# Patient Record
Sex: MALE | Race: OTHER | HISPANIC OR LATINO | URBAN - METROPOLITAN AREA
[De-identification: names, ages, dates, MRNs, and addresses within clinical notes are randomized per-mention and may not be internally consistent; named-entity substitution may affect disease eponyms.]

---

## 2020-09-16 ENCOUNTER — INPATIENT (INPATIENT)
Facility: HOSPITAL | Age: 70
LOS: 0 days | Discharge: LEFT BEFORE DISCHARGE | DRG: 640 | End: 2020-09-17
Attending: STUDENT IN AN ORGANIZED HEALTH CARE EDUCATION/TRAINING PROGRAM | Admitting: STUDENT IN AN ORGANIZED HEALTH CARE EDUCATION/TRAINING PROGRAM
Payer: COMMERCIAL

## 2020-09-16 VITALS
OXYGEN SATURATION: 98 % | DIASTOLIC BLOOD PRESSURE: 94 MMHG | HEART RATE: 67 BPM | RESPIRATION RATE: 18 BRPM | WEIGHT: 175.05 LBS | HEIGHT: 68 IN | TEMPERATURE: 98 F | SYSTOLIC BLOOD PRESSURE: 186 MMHG

## 2020-09-16 LAB
ANION GAP SERPL CALC-SCNC: 9 MMOL/L — SIGNIFICANT CHANGE UP (ref 5–17)
APPEARANCE UR: CLEAR — SIGNIFICANT CHANGE UP
BILIRUB UR-MCNC: NEGATIVE — SIGNIFICANT CHANGE UP
BUN SERPL-MCNC: 14 MG/DL — SIGNIFICANT CHANGE UP (ref 7–23)
CALCIUM SERPL-MCNC: 8.8 MG/DL — SIGNIFICANT CHANGE UP (ref 8.4–10.5)
CHLORIDE SERPL-SCNC: 93 MMOL/L — LOW (ref 96–108)
CO2 SERPL-SCNC: 22 MMOL/L — SIGNIFICANT CHANGE UP (ref 22–31)
COLOR SPEC: YELLOW — SIGNIFICANT CHANGE UP
CREAT SERPL-MCNC: 0.82 MG/DL — SIGNIFICANT CHANGE UP (ref 0.5–1.3)
DIFF PNL FLD: NEGATIVE — SIGNIFICANT CHANGE UP
GLUCOSE BLDC GLUCOMTR-MCNC: 89 MG/DL — SIGNIFICANT CHANGE UP (ref 70–99)
GLUCOSE SERPL-MCNC: 113 MG/DL — HIGH (ref 70–99)
GLUCOSE UR QL: NEGATIVE — SIGNIFICANT CHANGE UP
KETONES UR-MCNC: NEGATIVE — SIGNIFICANT CHANGE UP
LEUKOCYTE ESTERASE UR-ACNC: NEGATIVE — SIGNIFICANT CHANGE UP
NITRITE UR-MCNC: NEGATIVE — SIGNIFICANT CHANGE UP
OSMOLALITY UR: 469 MOSM/KG — SIGNIFICANT CHANGE UP (ref 300–900)
PH UR: 6.5 — SIGNIFICANT CHANGE UP (ref 5–8)
POTASSIUM SERPL-MCNC: 4.9 MMOL/L — SIGNIFICANT CHANGE UP (ref 3.5–5.3)
POTASSIUM SERPL-SCNC: 4.9 MMOL/L — SIGNIFICANT CHANGE UP (ref 3.5–5.3)
PROT UR-MCNC: NEGATIVE MG/DL — SIGNIFICANT CHANGE UP
SODIUM SERPL-SCNC: 124 MMOL/L — LOW (ref 135–145)
SODIUM UR-SCNC: 98 MMOL/L — SIGNIFICANT CHANGE UP
SP GR SPEC: 1.01 — SIGNIFICANT CHANGE UP (ref 1–1.03)
UROBILINOGEN FLD QL: 0.2 E.U./DL — SIGNIFICANT CHANGE UP

## 2020-09-16 PROCEDURE — 70450 CT HEAD/BRAIN W/O DYE: CPT | Mod: 26

## 2020-09-16 PROCEDURE — 93010 ELECTROCARDIOGRAM REPORT: CPT | Mod: NC

## 2020-09-16 PROCEDURE — 99285 EMERGENCY DEPT VISIT HI MDM: CPT

## 2020-09-16 PROCEDURE — 99223 1ST HOSP IP/OBS HIGH 75: CPT | Mod: GC

## 2020-09-16 RX ORDER — ACETAMINOPHEN 500 MG
650 TABLET ORAL ONCE
Refills: 0 | Status: COMPLETED | OUTPATIENT
Start: 2020-09-16 | End: 2020-09-16

## 2020-09-16 RX ORDER — DEXTROSE 50 % IN WATER 50 %
12.5 SYRINGE (ML) INTRAVENOUS ONCE
Refills: 0 | Status: DISCONTINUED | OUTPATIENT
Start: 2020-09-16 | End: 2020-09-17

## 2020-09-16 RX ORDER — CALCIUM CARBONATE 500(1250)
3 TABLET ORAL EVERY 6 HOURS
Refills: 0 | Status: DISCONTINUED | OUTPATIENT
Start: 2020-09-16 | End: 2020-09-17

## 2020-09-16 RX ORDER — INSULIN LISPRO 100/ML
5 VIAL (ML) SUBCUTANEOUS
Refills: 0 | Status: DISCONTINUED | OUTPATIENT
Start: 2020-09-16 | End: 2020-09-17

## 2020-09-16 RX ORDER — SODIUM CHLORIDE 9 MG/ML
1000 INJECTION INTRAMUSCULAR; INTRAVENOUS; SUBCUTANEOUS ONCE
Refills: 0 | Status: COMPLETED | OUTPATIENT
Start: 2020-09-16 | End: 2020-09-16

## 2020-09-16 RX ORDER — DEXTROSE 50 % IN WATER 50 %
25 SYRINGE (ML) INTRAVENOUS ONCE
Refills: 0 | Status: DISCONTINUED | OUTPATIENT
Start: 2020-09-16 | End: 2020-09-17

## 2020-09-16 RX ORDER — INSULIN LISPRO 100/ML
VIAL (ML) SUBCUTANEOUS AT BEDTIME
Refills: 0 | Status: DISCONTINUED | OUTPATIENT
Start: 2020-09-16 | End: 2020-09-17

## 2020-09-16 RX ORDER — SODIUM CHLORIDE 9 MG/ML
1000 INJECTION, SOLUTION INTRAVENOUS
Refills: 0 | Status: DISCONTINUED | OUTPATIENT
Start: 2020-09-16 | End: 2020-09-17

## 2020-09-16 RX ORDER — INSULIN GLARGINE 100 [IU]/ML
15 INJECTION, SOLUTION SUBCUTANEOUS AT BEDTIME
Refills: 0 | Status: DISCONTINUED | OUTPATIENT
Start: 2020-09-17 | End: 2020-09-17

## 2020-09-16 RX ORDER — DEXTROSE 50 % IN WATER 50 %
15 SYRINGE (ML) INTRAVENOUS ONCE
Refills: 0 | Status: DISCONTINUED | OUTPATIENT
Start: 2020-09-16 | End: 2020-09-17

## 2020-09-16 RX ORDER — ACETAMINOPHEN 500 MG
650 TABLET ORAL EVERY 4 HOURS
Refills: 0 | Status: DISCONTINUED | OUTPATIENT
Start: 2020-09-16 | End: 2020-09-17

## 2020-09-16 RX ORDER — INSULIN LISPRO 100/ML
VIAL (ML) SUBCUTANEOUS
Refills: 0 | Status: DISCONTINUED | OUTPATIENT
Start: 2020-09-16 | End: 2020-09-17

## 2020-09-16 RX ORDER — GLUCAGON INJECTION, SOLUTION 0.5 MG/.1ML
1 INJECTION, SOLUTION SUBCUTANEOUS ONCE
Refills: 0 | Status: DISCONTINUED | OUTPATIENT
Start: 2020-09-16 | End: 2020-09-17

## 2020-09-16 RX ORDER — METOCLOPRAMIDE HCL 10 MG
10 TABLET ORAL ONCE
Refills: 0 | Status: COMPLETED | OUTPATIENT
Start: 2020-09-16 | End: 2020-09-16

## 2020-09-16 RX ORDER — KETOROLAC TROMETHAMINE 30 MG/ML
15 SYRINGE (ML) INJECTION ONCE
Refills: 0 | Status: DISCONTINUED | OUTPATIENT
Start: 2020-09-16 | End: 2020-09-16

## 2020-09-16 RX ADMIN — Medication 650 MILLIGRAM(S): at 20:17

## 2020-09-16 RX ADMIN — Medication 10 MILLIGRAM(S): at 19:47

## 2020-09-16 RX ADMIN — Medication 15 MILLIGRAM(S): at 21:49

## 2020-09-16 RX ADMIN — Medication 15 MILLIGRAM(S): at 23:31

## 2020-09-16 RX ADMIN — SODIUM CHLORIDE 1000 MILLILITER(S): 9 INJECTION INTRAMUSCULAR; INTRAVENOUS; SUBCUTANEOUS at 19:47

## 2020-09-16 RX ADMIN — Medication 650 MILLIGRAM(S): at 19:47

## 2020-09-16 NOTE — ED PROVIDER NOTE - CLINICAL SUMMARY MEDICAL DECISION MAKING FREE TEXT BOX
70M PMH HTN, DM, p/w HA. Pt states he developed HA ~3-4w ago, was dx w/ COVID at that time. Since then has bitemporal HA, mostly constant but there are times where PIERSON is gone, some relief w/ motrin PRN. Came to ED today 2/2 measured his BP this morning and was 140s. Has occasional minimal vague dizziness, none currently. Cough resolved. No other systemic symptoms. Hypertensive, other vitals wnl. Exam as above. Well appearing.  ddx: Likely benign HA.  Given age and recent COVID, will check basic labs, CTH, symptom control, reassess.

## 2020-09-16 NOTE — H&P ADULT - NSICDXPASTMEDICALHX_GEN_ALL_CORE_FT
PAST MEDICAL HISTORY:  DM (diabetes mellitus), type 2     History of hyperkalemia     HTN (hypertension)

## 2020-09-16 NOTE — H&P ADULT - NSHPREVIEWOFSYSTEMS_GEN_ALL_CORE
REVIEW OF SYSTEMS:    CONSTITUTIONAL: Patient denies fevers or chills  EYES/ENT: Patient denies visual changes; denies vertigo or throat pain   NECK: Patient denies pain or stiffness  RESPIRATORY: Patient denies SOB, wheezing, hemoptysis  CARDIOVASCULAR: Patient denies chest pain or palpitations  GASTROINTESTINAL: Patient denies abdominal or epigastric pain, nausea, vomiting, or hematemesis, diarrhea or constipation, melena or hematochezia.  GENITOURINARY: Patient denies dysuria, frequency or hematuria  NEUROLOGICAL: Patient denies seizure, focal numbness, focal weakness  SKIN: Patient denies itching, burning, rashes, or lesions   All other review of systems is negative unless indicated above.

## 2020-09-16 NOTE — ED PROVIDER NOTE - PHYSICAL EXAMINATION
PERRL, EOMI, no nystagmus. CN intact. Strength 5/5. Normal F-->N. Steady unassisted gait. No pronator drift. Sensation intact. No carotid bruits. Negative Romberg, normal tandem gait.  no LE edema, normal equal distal pulses

## 2020-09-16 NOTE — ED ADULT NURSE NOTE - CHPI ED NUR SYMPTOMS NEG
no fever/no change in level of consciousness/no weakness/no loss of consciousness/no confusion/no nausea/no numbness/no blurred vision/no vomiting

## 2020-09-16 NOTE — H&P ADULT - NSHPSOCIALHISTORY_GEN_ALL_CORE
Lives with wife in [facility], still employed in [place], follows with [facility] [employer]    Denies significant tobacco, EtOH or other recreational substance use

## 2020-09-16 NOTE — ED PROVIDER NOTE - CARE PLAN
Principal Discharge DX:	Headache   Principal Discharge DX:	Headache  Secondary Diagnosis:	Hyponatremia

## 2020-09-16 NOTE — H&P ADULT - PROBLEM SELECTOR PLAN 7
# FEN  F: s/p 1 L NS in ED  E: replete cautiously given K 5.8 on admission  N: consistent CHO with evening snack  DVT ppx: Lovenox in COVID  GI ppx: none    Dispo: 3WO

## 2020-09-16 NOTE — H&P ADULT - NSHPLABSRESULTS_GEN_ALL_CORE
.  LABS:                         13.3   8.25  )-----------( 364      ( 16 Sep 2020 19:45 )             37.9     09-    124<L>  |  93<L>  |  14  ----------------------------<  113<H>  4.9   |  22  |  0.82    Ca    8.8      16 Sep 2020 21:31    TPro  6.9  /  Alb  4.3  /  TBili  0.3  /  DBili  x   /  AST  31  /  ALT  21  /  AlkPhos  61  -16    PT/INR - ( 16 Sep 2020 19:44 )   PT: 12.3 sec;   INR: 1.03          PTT - ( 16 Sep 2020 19:44 )  PTT:35.4 sec  Urinalysis Basic - ( 16 Sep 2020 22:28 )    Color: Yellow / Appearance: Clear / S.015 / pH: x  Gluc: x / Ketone: NEGATIVE  / Bili: Negative / Urobili: 0.2 E.U./dL   Blood: x / Protein: NEGATIVE mg/dL / Nitrite: NEGATIVE   Leuk Esterase: NEGATIVE / RBC: x / WBC x   Sq Epi: x / Non Sq Epi: x / Bacteria: x                RADIOLOGY, EKG & ADDITIONAL TESTS: Reviewed.

## 2020-09-16 NOTE — H&P ADULT - ASSESSMENT
Mr. Shultz is a 71 yo M with PMH of T2DM, HTN on losartan who presents with hypertensive urgency, incidentally found to have mild hyperkalemia and euvolemic isotonic hyponatremia. His headache, which is not different in character from prior headaches, and was relieved by NSAIDs/Tylenol/Reglan in the ED as well as by Motrin at home, does not appear to be consistent with true hypertensive emergency.      Mr. Shultz is a 71 yo M with PMH of T2DM, HTN on losartan who presents with hypertensive urgency, incidentally found to have mild hyperkalemia and isotonic vs hypotonic hyponatremia. His headache, which is not different in character from prior occasional headaches, and was relieved by NSAIDs/Tylenol/Reglan in the ED as well as by Motrin at home, does not appear to be consistent with true hypertensive emergency; cannot exclude that headache is being caused or exacerbated by hyponatremia.     # Hyponatremia with hyperkalemia  Na 125, K 5.8, BUN/Cr 14/0.93, serum osm 283, urine osm 469, urine sodium 98. If hypotonic, when associated with concentrated urine and high urine sodium, consider very mild relative adrenal insufficiency, either secondary to exogenous corticosteroids or secondary to acute illness. If truly isotonic, which is uncommon, exclude pseudohyponatremia secondary to severe dyslipidemia.  - f/u AM morning cortisol  - f/u repeat BMP in AM  - f/u lipid panel    # T2DM on home insulin  Home regimen Lantus 15 BID and Januvia 50 mg qd.   - Lantus 15 qhs   - lispro 5 mg TID with meals  -MDSSI  - f/u A1c    # Hypertensive urgency  In setting of hyperkalemia, would favor holding home losartan  - consider amlodipine 5 mg but will hold off for now given improvement with treatment of headache symptoms, especially given that if there is occult adrenal insufficiency, overt adrenal insufficiency would likely be represented by hypotension.     # FEN  F: s/p 1 L NS in ED  E: replete cautiously given K 5.8 on admission  N: consistent CHO with evening snack  DVT ppx: Lovenox in COVID  GI ppx: none    Dispo: 3WO   Mr. Shultz is a 71 yo M with PMH of T2DM, HTN on losartan who presents with hypertensive urgency, incidentally found to have mild hyperkalemia and isotonic vs hypotonic hyponatremia. His headache, which is not different in character from prior occasional headaches, and was relieved by NSAIDs/Tylenol/Reglan in the ED as well as by Motrin at home, does not appear to be consistent with true hypertensive emergency; cannot exclude that headache is being caused or exacerbated by hyponatremia.

## 2020-09-16 NOTE — ED PROVIDER NOTE - OBJECTIVE STATEMENT
70M PMH HTN, DM, p/w HA. Pt states he developed HA ~3-4w ago, was dx w/ COVID at that time. Since then has bitemporal HA, mostly constant but there are times where PIERSON is gone, some relief w/ motrin PRN. Came to ED today 2/2 measured his BP this morning and was 140s. Has occasional minimal vague dizziness, none currently. Cough resolved. Denies vision changes, focal weakness/numbness, neck pain, rashes, tinnitus, hearing changes, rhinorrhea, f/c, SOB/CP, NVD, abd pain, urinary complaints, black/bloody stool.

## 2020-09-16 NOTE — ED PROVIDER NOTE - PROGRESS NOTE DETAILS
[place]pfish: na 125, k 5.8. pt on losartan, januvia, insulin. EKG w/o hyperkalemic EKG changes. CTH no acute pathology. Still w/ HA but much improved. Will repeat BMP, if real hyponatremia/hyperkalemia, will likely require admission for further care. [place]: vitals improving. repeat k improved, repeat na downtrended to 124. Will admit for likely incidental hyponatremia (thugh does have vague intermittent dizziness). updated pt. clinically benign HA. Given recent covid, will admit under covid precautions.

## 2020-09-16 NOTE — H&P ADULT - PROBLEM SELECTOR PLAN 6
# T2DM on home insulin  Home regimen Lantus 15 BID and Januvia 50 mg qd.   - Lantus 15 qhs   - lispro 5 mg TID with meals  -MDSSI  - f/u A1c

## 2020-09-16 NOTE — H&P ADULT - PROBLEM SELECTOR PLAN 5
# Hypertensive urgency  In setting of hyperkalemia, would favor holding home losartan  - consider amlodipine 5 mg but will hold off for now given improvement with treatment of headache symptoms, especially given that if there is occult adrenal insufficiency, overt adrenal insufficiency would likely be represented by hypotension.

## 2020-09-16 NOTE — H&P ADULT - NSHPPHYSICALEXAM_GEN_ALL_CORE
VITAL SIGNS:  T(C): 36.4 (09-17-20 @ 01:26), Max: 36.9 (09-16-20 @ 19:15)  T(F): 97.5 (09-17-20 @ 01:26), Max: 98.5 (09-16-20 @ 19:15)  HR: 53 (09-17-20 @ 01:26) (53 - 67)  BP: 160/82 (09-17-20 @ 01:26) (154/86 - 186/94)  BP(mean): --  RR: 18 (09-17-20 @ 01:26) (18 - 18)  SpO2: 99% (09-17-20 @ 01:26) (98% - 100%)  Wt(kg): --    PHYSICAL EXAM:    Constitutional: Adult White male, well-appearing, comfortable on room air  Head: NC/AT  Eyes: PERRL, EOMI, anicteric sclera  ENT: MMM  Neck: no JVD  Respiratory: CTA B/L; no W/R/R  Cardiac: RRR  Gastrointestinal: soft, NT/ND; no rebound or guarding  Extremities: WWP, no cyanosis; no peripheral edema  Musculoskeletal: no joint swelling, tenderness or erythema  Vascular: 2+ radial, PT pulses bilaterally  Dermatologic: skin warm, dry and intact; no rashes, wounds, or scars  Neurologic: AAOx3; cranial nerves grossly intact; no gross focal deficits; normal gait and station  Psychiatric: affect and characteristics of appearance, verbalizations, behaviors are appropriate

## 2020-09-16 NOTE — H&P ADULT - ATTENDING COMMENTS
patient seen and examined overnight     reviewed pertinent data , h&p; a/f hyponatremia, pt w/ Covid dx on 9/3, received amoxicillin per wife, denies steroid use.     PE findings as above, in addition pt is overweight, in NAD, p/w wife , appears euvolemic     1. hyponatremia: cause unclear, followup CXR, legionella, monitor BMP, check TSH, lipids, AM cortisol, serum osm, urine studies; holding off on further IVFs overnight ; clarify home medications and medications given when seen for COVID by PCP        rest of  plan as above, with noted addenda patient seen and examined overnight     reviewed pertinent data , h&p; a/f hyponatremia, pt w/ Covid dx on in early september, received amoxicillin per wife, denies steroid use.     PE findings as above, in addition pt is overweight, in NAD, p/w wife , appears euvolemic     1. hyponatremia: cause unclear, followup CXR, legionella, monitor BMP, check TSH, lipids, AM cortisol, serum osm, urine studies; holding off on further IVFs overnight ; clarify home medications and medications given when seen for COVID by PCP        rest of  plan as above, with noted addenda

## 2020-09-16 NOTE — H&P ADULT - HISTORY OF PRESENT ILLNESS
Mr. Shultz is a 71 yo M with PMH of T2DM (insulin, Januvia), hyperkalemia, HTN (on losartan), recent dx of COVID-19 infection without hypoxia (symptom onset 8/25/20, positive COVID-19 test dated 9/1/20 reviewed on patient's phone), admitted today with a 2-day history of headache. The headache is bilateral and frontal in nature and is not particularly more intense than other headaches the patient has had in the past. He presented because he was concerned that his BP was more elevated than usual (140s systolic at home, usually normotensive), prompting him to present here. He consulted with [facility] and they directed him to the ED on the basis of reported headache. [place] was in the city for work. His wife is also recently dx as COVID-19 positive, and she is at the bedside. Both are wearing masks. The couple lives in [facility] and the patient commutes to the city for work. There is no personal history of seizure disorder or CVA. Denies any current fever, chills, dizziness, SOB, nausea, vomiting, diarrhea.    ED Course: Vitals 98.5 (oral), 67, 186/94, 18, 98% on RA. Na 125, K 5.8, BUN/Cr 14/0.93, serum osm 283, urine osm 469, urine sodium 98. CTH unremarkable.

## 2020-09-16 NOTE — H&P ADULT - PROBLEM SELECTOR PLAN 1
# Hyponatremia with hyperkalemia  Na 125, K 5.8, BUN/Cr 14/0.93, serum osm 283, urine osm 469, urine sodium 98. If hypotonic, when associated with concentrated urine and high urine sodium, consider very mild relative adrenal insufficiency, either secondary to exogenous corticosteroids or secondary to acute illness. If truly isotonic, which is uncommon, exclude pseudohyponatremia secondary to severe dyslipidemia.  - f/u AM morning cortisol  - f/u repeat BMP in AM  - f/u lipid panel    ADDENDUM: check CXR and legionella, monitor BMP, hold ARB for now

## 2020-09-16 NOTE — ED ADULT NURSE NOTE - OBJECTIVE STATEMENT
69 yo M pmhx HTN, DM COVID presents to ED co temporal headache x20 days, pt became concerned that pain would not go away and came to ED. Pt AOx3, speaking clearly and coherently in full sentences, ambulating with steady gait. Sensation intact. Pt reports relief from Motrin, unsure of aggravating factors. Denies recent cough, fevers, chills, N/V/D.

## 2020-09-16 NOTE — ED PROVIDER NOTE - NSFOLLOWUPINSTRUCTIONS_ED_ALL_ED_FT
Can take tylenol 650mg every 6hrs as needed for pain.  Stay well hydrated.  Return for fevers, persistent vomit, uncontrolled pain, worsening breathing, worsening lightheaded, focal weakness/numbness, worsening vision changes.  Follow up with primary doctor within 1-2 days.   Follow up with neurologist. Can call [phone] to schedule appointment.     A headache is pain or discomfort felt around the head or neck area. The specific cause of a headache may not be found. There are many causes and types of headaches. A few common ones are:  Tension headaches.  Migraine headaches.  Cluster headaches.  Chronic daily headaches.  Follow these instructions at home:  Watch your condition for any changes.     Take these steps to help with your condition:  Managing pain   Take over-the-counter and prescription medicines only as told by your health care provider.  Lie down in a dark, quiet room when you have a headache.  If directed, apply ice to the head and neck area:  Put ice in a plastic bag.  Place a towel between your skin and the bag.  Leave the ice on for 20 minutes, 2–3 times per day.  Use a heating pad or hot shower to apply heat to the head and neck area as told by your health care provider.  Keep lights dim if bright lights bother you or make your headaches worse.    Eating and drinking   Eat meals on a regular schedule.  Limit alcohol use.  Decrease the amount of caffeine you drink, or stop drinking caffeine.    General instructions      Keep all follow-up visits as told by your health care provider. This is important.  Keep a headache journal to help find out what may trigger your headaches. For example, write down:  What you eat and drink.  How much sleep you get.  Any change to your diet or medicines.  Try massage or other relaxation techniques.  Limit stress.  Sit up straight, and do not tense your muscles.  Do not use tobacco products, including cigarettes, chewing tobacco, or e-cigarettes. If you need help quitting, ask your health care provider.  Exercise regularly as told by your health care provider.  Sleep on a regular schedule. Get 7–9 hours of sleep, or the amount recommended by your health care provider.    Contact a health care provider if:  Your symptoms are not helped by medicine.  You have a headache that is different from the usual headache.  You have nausea or you vomit.  You have a fever.  Get help right away if:  Your headache becomes severe.  You have repeated vomiting.  You have a stiff neck.  You have a loss of vision.  You have problems with speech.  You have pain in the eye or ear.  You have muscular weakness or loss of muscle control.  You lose your balance or have trouble walking.  You feel faint or pass out.  You have confusion.

## 2020-09-16 NOTE — H&P ADULT - PROBLEM SELECTOR PLAN 3
ADDENDUM: pt and wife report being dxd w/ COVID on 9/3 after experiencing sxs of HA x 1 week. denies any other sxs, monitor o/n, followup CXR

## 2020-09-17 VITALS — TEMPERATURE: 97 F

## 2020-09-17 LAB
A1C WITH ESTIMATED AVERAGE GLUCOSE RESULT: 8 % — HIGH (ref 4–5.6)
ANION GAP SERPL CALC-SCNC: 7 MMOL/L — SIGNIFICANT CHANGE UP (ref 5–17)
ANION GAP SERPL CALC-SCNC: 8 MMOL/L — SIGNIFICANT CHANGE UP (ref 5–17)
ANION GAP SERPL CALC-SCNC: 8 MMOL/L — SIGNIFICANT CHANGE UP (ref 5–17)
ANION GAP SERPL CALC-SCNC: 9 MMOL/L — SIGNIFICANT CHANGE UP (ref 5–17)
APTT BLD: 33 SEC — SIGNIFICANT CHANGE UP (ref 27.5–35.5)
BASOPHILS # BLD AUTO: 0.06 K/UL — SIGNIFICANT CHANGE UP (ref 0–0.2)
BASOPHILS NFR BLD AUTO: 0.9 % — SIGNIFICANT CHANGE UP (ref 0–2)
BUN SERPL-MCNC: 15 MG/DL — SIGNIFICANT CHANGE UP (ref 7–23)
BUN SERPL-MCNC: 16 MG/DL — SIGNIFICANT CHANGE UP (ref 7–23)
CALCIUM SERPL-MCNC: 8.8 MG/DL — SIGNIFICANT CHANGE UP (ref 8.4–10.5)
CALCIUM SERPL-MCNC: 8.9 MG/DL — SIGNIFICANT CHANGE UP (ref 8.4–10.5)
CALCIUM SERPL-MCNC: 9.2 MG/DL — SIGNIFICANT CHANGE UP (ref 8.4–10.5)
CALCIUM SERPL-MCNC: 9.3 MG/DL — SIGNIFICANT CHANGE UP (ref 8.4–10.5)
CHLORIDE SERPL-SCNC: 91 MMOL/L — LOW (ref 96–108)
CHLORIDE SERPL-SCNC: 91 MMOL/L — LOW (ref 96–108)
CHLORIDE SERPL-SCNC: 92 MMOL/L — LOW (ref 96–108)
CHLORIDE SERPL-SCNC: 93 MMOL/L — LOW (ref 96–108)
CHOLEST SERPL-MCNC: 131 MG/DL — SIGNIFICANT CHANGE UP (ref 10–199)
CO2 SERPL-SCNC: 23 MMOL/L — SIGNIFICANT CHANGE UP (ref 22–31)
CO2 SERPL-SCNC: 24 MMOL/L — SIGNIFICANT CHANGE UP (ref 22–31)
CO2 SERPL-SCNC: 25 MMOL/L — SIGNIFICANT CHANGE UP (ref 22–31)
CO2 SERPL-SCNC: 25 MMOL/L — SIGNIFICANT CHANGE UP (ref 22–31)
CORTIS AM PEAK SERPL-MCNC: 5.8 UG/DL — SIGNIFICANT CHANGE UP (ref 3.9–37.5)
CREAT SERPL-MCNC: 0.73 MG/DL — SIGNIFICANT CHANGE UP (ref 0.5–1.3)
CREAT SERPL-MCNC: 0.77 MG/DL — SIGNIFICANT CHANGE UP (ref 0.5–1.3)
CREAT SERPL-MCNC: 0.85 MG/DL — SIGNIFICANT CHANGE UP (ref 0.5–1.3)
CREAT SERPL-MCNC: 0.9 MG/DL — SIGNIFICANT CHANGE UP (ref 0.5–1.3)
EOSINOPHIL # BLD AUTO: 0.13 K/UL — SIGNIFICANT CHANGE UP (ref 0–0.5)
EOSINOPHIL NFR BLD AUTO: 1.9 % — SIGNIFICANT CHANGE UP (ref 0–6)
ESTIMATED AVERAGE GLUCOSE: 183 MG/DL — HIGH (ref 68–114)
GLUCOSE BLDC GLUCOMTR-MCNC: 142 MG/DL — HIGH (ref 70–99)
GLUCOSE BLDC GLUCOMTR-MCNC: 71 MG/DL — SIGNIFICANT CHANGE UP (ref 70–99)
GLUCOSE BLDC GLUCOMTR-MCNC: 99 MG/DL — SIGNIFICANT CHANGE UP (ref 70–99)
GLUCOSE SERPL-MCNC: 106 MG/DL — HIGH (ref 70–99)
GLUCOSE SERPL-MCNC: 134 MG/DL — HIGH (ref 70–99)
GLUCOSE SERPL-MCNC: 148 MG/DL — HIGH (ref 70–99)
GLUCOSE SERPL-MCNC: 81 MG/DL — SIGNIFICANT CHANGE UP (ref 70–99)
HCT VFR BLD CALC: 33.5 % — LOW (ref 39–50)
HCV AB S/CO SERPL IA: 0.11 S/CO — SIGNIFICANT CHANGE UP
HCV AB SERPL-IMP: SIGNIFICANT CHANGE UP
HDLC SERPL-MCNC: 48 MG/DL — SIGNIFICANT CHANGE UP
HGB BLD-MCNC: 12 G/DL — LOW (ref 13–17)
IMM GRANULOCYTES NFR BLD AUTO: 0.3 % — SIGNIFICANT CHANGE UP (ref 0–1.5)
INR BLD: 0.99 — SIGNIFICANT CHANGE UP (ref 0.88–1.16)
LEGIONELLA AG UR QL: NEGATIVE — SIGNIFICANT CHANGE UP
LIPID PNL WITH DIRECT LDL SERPL: 67 MG/DL — SIGNIFICANT CHANGE UP
LYMPHOCYTES # BLD AUTO: 2.82 K/UL — SIGNIFICANT CHANGE UP (ref 1–3.3)
LYMPHOCYTES # BLD AUTO: 42.2 % — SIGNIFICANT CHANGE UP (ref 13–44)
MAGNESIUM SERPL-MCNC: 1.8 MG/DL — SIGNIFICANT CHANGE UP (ref 1.6–2.6)
MCHC RBC-ENTMCNC: 30.8 PG — SIGNIFICANT CHANGE UP (ref 27–34)
MCHC RBC-ENTMCNC: 35.8 GM/DL — SIGNIFICANT CHANGE UP (ref 32–36)
MCV RBC AUTO: 86.1 FL — SIGNIFICANT CHANGE UP (ref 80–100)
MONOCYTES # BLD AUTO: 0.88 K/UL — SIGNIFICANT CHANGE UP (ref 0–0.9)
MONOCYTES NFR BLD AUTO: 13.2 % — SIGNIFICANT CHANGE UP (ref 2–14)
NEUTROPHILS # BLD AUTO: 2.78 K/UL — SIGNIFICANT CHANGE UP (ref 1.8–7.4)
NEUTROPHILS NFR BLD AUTO: 41.5 % — LOW (ref 43–77)
NRBC # BLD: 0 /100 WBCS — SIGNIFICANT CHANGE UP (ref 0–0)
OSMOLALITY SERPL: 265 MOSM/KG — LOW (ref 280–301)
OSMOLALITY SERPL: 273 MOSM/KG — LOW (ref 280–301)
OSMOLALITY UR: 306 MOSM/KG — SIGNIFICANT CHANGE UP (ref 300–900)
PLATELET # BLD AUTO: 306 K/UL — SIGNIFICANT CHANGE UP (ref 150–400)
POTASSIUM SERPL-MCNC: 4.6 MMOL/L — SIGNIFICANT CHANGE UP (ref 3.5–5.3)
POTASSIUM SERPL-MCNC: 4.7 MMOL/L — SIGNIFICANT CHANGE UP (ref 3.5–5.3)
POTASSIUM SERPL-MCNC: 4.9 MMOL/L — SIGNIFICANT CHANGE UP (ref 3.5–5.3)
POTASSIUM SERPL-MCNC: 5.5 MMOL/L — HIGH (ref 3.5–5.3)
POTASSIUM SERPL-SCNC: 4.6 MMOL/L — SIGNIFICANT CHANGE UP (ref 3.5–5.3)
POTASSIUM SERPL-SCNC: 4.7 MMOL/L — SIGNIFICANT CHANGE UP (ref 3.5–5.3)
POTASSIUM SERPL-SCNC: 4.9 MMOL/L — SIGNIFICANT CHANGE UP (ref 3.5–5.3)
POTASSIUM SERPL-SCNC: 5.5 MMOL/L — HIGH (ref 3.5–5.3)
PROTHROM AB SERPL-ACNC: 11.9 SEC — SIGNIFICANT CHANGE UP (ref 10.6–13.6)
RBC # BLD: 3.89 M/UL — LOW (ref 4.2–5.8)
RBC # FLD: 11 % — SIGNIFICANT CHANGE UP (ref 10.3–14.5)
SARS-COV-2 RNA SPEC QL NAA+PROBE: DETECTED
SODIUM SERPL-SCNC: 122 MMOL/L — LOW (ref 135–145)
SODIUM SERPL-SCNC: 123 MMOL/L — LOW (ref 135–145)
SODIUM SERPL-SCNC: 125 MMOL/L — LOW (ref 135–145)
SODIUM SERPL-SCNC: 126 MMOL/L — LOW (ref 135–145)
SODIUM UR-SCNC: 66 MMOL/L — SIGNIFICANT CHANGE UP
TOTAL CHOLESTEROL/HDL RATIO MEASUREMENT: 2.7 RATIO — LOW (ref 3.4–9.6)
TRIGL SERPL-MCNC: 79 MG/DL — SIGNIFICANT CHANGE UP (ref 10–149)
TSH SERPL-MCNC: 1.38 UIU/ML — SIGNIFICANT CHANGE UP (ref 0.35–4.94)
WBC # BLD: 6.69 K/UL — SIGNIFICANT CHANGE UP (ref 3.8–10.5)
WBC # FLD AUTO: 6.69 K/UL — SIGNIFICANT CHANGE UP (ref 3.8–10.5)

## 2020-09-17 PROCEDURE — 83930 ASSAY OF BLOOD OSMOLALITY: CPT

## 2020-09-17 PROCEDURE — 96374 THER/PROPH/DIAG INJ IV PUSH: CPT

## 2020-09-17 PROCEDURE — 86803 HEPATITIS C AB TEST: CPT

## 2020-09-17 PROCEDURE — 84443 ASSAY THYROID STIM HORMONE: CPT

## 2020-09-17 PROCEDURE — 99254 IP/OBS CNSLTJ NEW/EST MOD 60: CPT

## 2020-09-17 PROCEDURE — 87086 URINE CULTURE/COLONY COUNT: CPT

## 2020-09-17 PROCEDURE — 99233 SBSQ HOSP IP/OBS HIGH 50: CPT | Mod: GC

## 2020-09-17 PROCEDURE — 36415 COLL VENOUS BLD VENIPUNCTURE: CPT

## 2020-09-17 PROCEDURE — 83735 ASSAY OF MAGNESIUM: CPT

## 2020-09-17 PROCEDURE — 99285 EMERGENCY DEPT VISIT HI MDM: CPT | Mod: 25

## 2020-09-17 PROCEDURE — 85025 COMPLETE CBC W/AUTO DIFF WBC: CPT

## 2020-09-17 PROCEDURE — 87449 NOS EACH ORGANISM AG IA: CPT

## 2020-09-17 PROCEDURE — 83935 ASSAY OF URINE OSMOLALITY: CPT

## 2020-09-17 PROCEDURE — 93005 ELECTROCARDIOGRAM TRACING: CPT

## 2020-09-17 PROCEDURE — 83036 HEMOGLOBIN GLYCOSYLATED A1C: CPT

## 2020-09-17 PROCEDURE — 70450 CT HEAD/BRAIN W/O DYE: CPT

## 2020-09-17 PROCEDURE — 96375 TX/PRO/DX INJ NEW DRUG ADDON: CPT

## 2020-09-17 PROCEDURE — 80048 BASIC METABOLIC PNL TOTAL CA: CPT

## 2020-09-17 PROCEDURE — 82533 TOTAL CORTISOL: CPT

## 2020-09-17 PROCEDURE — 82962 GLUCOSE BLOOD TEST: CPT

## 2020-09-17 PROCEDURE — 71045 X-RAY EXAM CHEST 1 VIEW: CPT | Mod: 26

## 2020-09-17 PROCEDURE — 85610 PROTHROMBIN TIME: CPT

## 2020-09-17 PROCEDURE — 87635 SARS-COV-2 COVID-19 AMP PRB: CPT

## 2020-09-17 PROCEDURE — 80061 LIPID PANEL: CPT

## 2020-09-17 PROCEDURE — 84300 ASSAY OF URINE SODIUM: CPT

## 2020-09-17 PROCEDURE — 80053 COMPREHEN METABOLIC PANEL: CPT

## 2020-09-17 PROCEDURE — 81003 URINALYSIS AUTO W/O SCOPE: CPT

## 2020-09-17 PROCEDURE — 71045 X-RAY EXAM CHEST 1 VIEW: CPT

## 2020-09-17 PROCEDURE — 85730 THROMBOPLASTIN TIME PARTIAL: CPT

## 2020-09-17 RX ORDER — LOSARTAN POTASSIUM 100 MG/1
100 TABLET, FILM COATED ORAL DAILY
Refills: 0 | Status: DISCONTINUED | OUTPATIENT
Start: 2020-09-17 | End: 2020-09-17

## 2020-09-17 RX ORDER — AMLODIPINE BESYLATE 2.5 MG/1
2.5 TABLET ORAL EVERY 24 HOURS
Refills: 0 | Status: DISCONTINUED | OUTPATIENT
Start: 2020-09-17 | End: 2020-09-17

## 2020-09-17 RX ORDER — SITAGLIPTIN 50 MG/1
1 TABLET, FILM COATED ORAL
Qty: 0 | Refills: 0 | DISCHARGE

## 2020-09-17 RX ORDER — INFLUENZA VIRUS VACCINE 15; 15; 15; 15 UG/.5ML; UG/.5ML; UG/.5ML; UG/.5ML
0.7 SUSPENSION INTRAMUSCULAR ONCE
Refills: 0 | Status: DISCONTINUED | OUTPATIENT
Start: 2020-09-17 | End: 2020-09-17

## 2020-09-17 RX ORDER — LOSARTAN POTASSIUM 100 MG/1
1 TABLET, FILM COATED ORAL
Qty: 0 | Refills: 0 | DISCHARGE

## 2020-09-17 RX ORDER — INFLUENZA VIRUS VACCINE 15; 15; 15; 15 UG/.5ML; UG/.5ML; UG/.5ML; UG/.5ML
0.5 SUSPENSION INTRAMUSCULAR ONCE
Refills: 0 | Status: DISCONTINUED | OUTPATIENT
Start: 2020-09-17 | End: 2020-09-17

## 2020-09-17 RX ORDER — ENOXAPARIN SODIUM 100 MG/ML
40 INJECTION SUBCUTANEOUS DAILY
Refills: 0 | Status: DISCONTINUED | OUTPATIENT
Start: 2020-09-17 | End: 2020-09-17

## 2020-09-17 RX ORDER — INSULIN GLARGINE 100 [IU]/ML
15 INJECTION, SOLUTION SUBCUTANEOUS
Qty: 0 | Refills: 0 | DISCHARGE

## 2020-09-17 RX ADMIN — ENOXAPARIN SODIUM 40 MILLIGRAM(S): 100 INJECTION SUBCUTANEOUS at 12:22

## 2020-09-17 RX ADMIN — AMLODIPINE BESYLATE 2.5 MILLIGRAM(S): 2.5 TABLET ORAL at 08:48

## 2020-09-17 NOTE — PROGRESS NOTE ADULT - SUBJECTIVE AND OBJECTIVE BOX
INCOMPLETE NOTE IN PROGRESS    OVERNIGHT EVENTS:    SUBJECTIVE / INTERVAL HPI: Patient seen and examined at bedside. No fevers/chills, nausea, vomiting, diarrhea, abdominal pain, chest pain, shortness of breath.      VITAL SIGNS:  Vital Signs Last 24 Hrs  T(C): 38.6 (17 Sep 2020 06:51), Max: 38.6 (17 Sep 2020 06:51)  T(F): 101.4 (17 Sep 2020 06:51), Max: 101.4 (17 Sep 2020 06:51)  HR: 55 (17 Sep 2020 06:51) (53 - 67)  BP: 173/83 (17 Sep 2020 06:51) (154/86 - 186/94)  BP(mean): --  RR: 18 (17 Sep 2020 06:51) (18 - 18)  SpO2: 99% (17 Sep 2020 06:51) (98% - 100%)    PHYSICAL EXAM:    General: WDWN  HEENT: NC/AT; PERRL, anicteric sclera; MMM  Neck: supple  Cardiovascular: +S1/S2; RRR  Respiratory: CTA B/L; no W/R/R  Gastrointestinal: soft, NT/ND; +BSx4  Extremities: WWP; no edema, clubbing or cyanosis  Vascular: 2+ radial, DP/PT pulses B/L  Neurological: AAOx3; no focal deficits    MEDICATIONS:  MEDICATIONS  (STANDING):  dextrose 5%. 1000 milliLiter(s) (50 mL/Hr) IV Continuous <Continuous>  dextrose 50% Injectable 12.5 Gram(s) IV Push once  dextrose 50% Injectable 25 Gram(s) IV Push once  dextrose 50% Injectable 25 Gram(s) IV Push once  insulin glargine Injectable (LANTUS) 15 Unit(s) SubCutaneous at bedtime  insulin lispro (HumaLOG) corrective regimen sliding scale   SubCutaneous three times a day before meals  insulin lispro (HumaLOG) corrective regimen sliding scale   SubCutaneous at bedtime  insulin lispro Injectable (HumaLOG) 5 Unit(s) SubCutaneous three times a day before meals    MEDICATIONS  (PRN):  acetaminophen   Tablet .. 650 milliGRAM(s) Oral every 4 hours PRN Mild Pain (1 - 3)  calcium carbonate    500 mG (Tums) Chewable 3 Tablet(s) Chew every 6 hours PRN Dyspepsia  dextrose 40% Gel 15 Gram(s) Oral once PRN Blood Glucose LESS THAN 70 milliGRAM(s)/deciliter  glucagon  Injectable 1 milliGRAM(s) IntraMuscular once PRN Glucose LESS THAN 70 milligrams/deciliter      ALLERGIES:  Allergies    No Known Allergies    Intolerances        LABS:                        12.0   6.69  )-----------( 306      ( 17 Sep 2020 05:49 )             33.5         123<L>  |  92<L>  |  16  ----------------------------<  81  4.7   |  24  |  0.85    Ca    8.8      17 Sep 2020 05:49  Mg     1.8         TPro  6.9  /  Alb  4.3  /  TBili  0.3  /  DBili  x   /  AST  31  /  ALT  21  /  AlkPhos  61  -16    PT/INR - ( 17 Sep 2020 05:49 )   PT: 11.9 sec;   INR: 0.99          PTT - ( 17 Sep 2020 05:49 )  PTT:33.0 sec  Urinalysis Basic - ( 16 Sep 2020 22:28 )    Color: Yellow / Appearance: Clear / S.015 / pH: x  Gluc: x / Ketone: NEGATIVE  / Bili: Negative / Urobili: 0.2 E.U./dL   Blood: x / Protein: NEGATIVE mg/dL / Nitrite: NEGATIVE   Leuk Esterase: NEGATIVE / RBC: x / WBC x   Sq Epi: x / Non Sq Epi: x / Bacteria: x      CAPILLARY BLOOD GLUCOSE      POCT Blood Glucose.: 71 mg/dL (17 Sep 2020 06:49)      RADIOLOGY & ADDITIONAL TESTS: Reviewed.   OVERNIGHT EVENTS: No acute events overnight.    SUBJECTIVE / INTERVAL HPI: Patient seen and examined at bedside. No fevers/chills, nausea, vomiting, diarrhea, abdominal pain, chest pain, shortness of breath.      VITAL SIGNS:  Vital Signs Last 24 Hrs  T(C): 38.6 (17 Sep 2020 06:51), Max: 38.6 (17 Sep 2020 06:51)  T(F): 101.4 (17 Sep 2020 06:51), Max: 101.4 (17 Sep 2020 06:51)  HR: 55 (17 Sep 2020 06:51) (53 - 67)  BP: 173/83 (17 Sep 2020 06:51) (154/86 - 186/94)  BP(mean): --  RR: 18 (17 Sep 2020 06:51) (18 - 18)  SpO2: 99% (17 Sep 2020 06:51) (98% - 100%)    PHYSICAL EXAM:    General: WDWN  HEENT: NC/AT; anicteric sclera; MMM  Cardiovascular: +S1/S2; RRR  Respiratory: CTA B/L; no W/R/R  Gastrointestinal: soft, NT/ND; +BSx4  Extremities: WWP; no edema, clubbing or cyanosis  Vascular: 2+ radial, DP pulses B/L  Neurological: AAOx3; no focal deficits    MEDICATIONS:  MEDICATIONS  (STANDING):  dextrose 5%. 1000 milliLiter(s) (50 mL/Hr) IV Continuous <Continuous>  dextrose 50% Injectable 12.5 Gram(s) IV Push once  dextrose 50% Injectable 25 Gram(s) IV Push once  dextrose 50% Injectable 25 Gram(s) IV Push once  insulin glargine Injectable (LANTUS) 15 Unit(s) SubCutaneous at bedtime  insulin lispro (HumaLOG) corrective regimen sliding scale   SubCutaneous three times a day before meals  insulin lispro (HumaLOG) corrective regimen sliding scale   SubCutaneous at bedtime  insulin lispro Injectable (HumaLOG) 5 Unit(s) SubCutaneous three times a day before meals    MEDICATIONS  (PRN):  acetaminophen   Tablet .. 650 milliGRAM(s) Oral every 4 hours PRN Mild Pain (1 - 3)  calcium carbonate    500 mG (Tums) Chewable 3 Tablet(s) Chew every 6 hours PRN Dyspepsia  dextrose 40% Gel 15 Gram(s) Oral once PRN Blood Glucose LESS THAN 70 milliGRAM(s)/deciliter  glucagon  Injectable 1 milliGRAM(s) IntraMuscular once PRN Glucose LESS THAN 70 milligrams/deciliter      ALLERGIES:  Allergies    No Known Allergies    Intolerances        LABS:                        12.0   6.69  )-----------( 306      ( 17 Sep 2020 05:49 )             33.5         123<L>  |  92<L>  |  16  ----------------------------<  81  4.7   |  24  |  0.85    Ca    8.8      17 Sep 2020 05:49  Mg     1.8         TPro  6.9  /  Alb  4.3  /  TBili  0.3  /  DBili  x   /  AST  31  /  ALT  21  /  AlkPhos  61  16    PT/INR - ( 17 Sep 2020 05:49 )   PT: 11.9 sec;   INR: 0.99          PTT - ( 17 Sep 2020 05:49 )  PTT:33.0 sec  Urinalysis Basic - ( 16 Sep 2020 22:28 )    Color: Yellow / Appearance: Clear / S.015 / pH: x  Gluc: x / Ketone: NEGATIVE  / Bili: Negative / Urobili: 0.2 E.U./dL   Blood: x / Protein: NEGATIVE mg/dL / Nitrite: NEGATIVE   Leuk Esterase: NEGATIVE / RBC: x / WBC x   Sq Epi: x / Non Sq Epi: x / Bacteria: x      CAPILLARY BLOOD GLUCOSE      POCT Blood Glucose.: 71 mg/dL (17 Sep 2020 06:49)      RADIOLOGY & ADDITIONAL TESTS: Reviewed.   OVERNIGHT EVENTS: No acute events overnight.    SUBJECTIVE / INTERVAL HPI: Patient seen and examined at bedside. When evaluated, patient reported headache was improved after tylenol. No fevers/chills, nausea, vomiting, diarrhea, abdominal pain, chest pain, shortness of breath.      VITAL SIGNS:  Vital Signs Last 24 Hrs  T(C): 38.6 (17 Sep 2020 06:51), Max: 38.6 (17 Sep 2020 06:51)  T(F): 101.4 (17 Sep 2020 06:51), Max: 101.4 (17 Sep 2020 06:51)  HR: 55 (17 Sep 2020 06:51) (53 - 67)  BP: 173/83 (17 Sep 2020 06:51) (154/86 - 186/94)  BP(mean): --  RR: 18 (17 Sep 2020 06:51) (18 - 18)  SpO2: 99% (17 Sep 2020 06:51) (98% - 100%)    PHYSICAL EXAM:    General: WDWN  HEENT: NC/AT; anicteric sclera; MMM  Cardiovascular: +S1/S2; RRR  Respiratory: CTA B/L; no W/R/R  Gastrointestinal: soft, NT/ND; +BSx4  Extremities: WWP; no edema, clubbing or cyanosis  Vascular: 2+ radial, DP pulses B/L  Neurological: AAOx3; no focal deficits    MEDICATIONS:  MEDICATIONS  (STANDING):  dextrose 5%. 1000 milliLiter(s) (50 mL/Hr) IV Continuous <Continuous>  dextrose 50% Injectable 12.5 Gram(s) IV Push once  dextrose 50% Injectable 25 Gram(s) IV Push once  dextrose 50% Injectable 25 Gram(s) IV Push once  insulin glargine Injectable (LANTUS) 15 Unit(s) SubCutaneous at bedtime  insulin lispro (HumaLOG) corrective regimen sliding scale   SubCutaneous three times a day before meals  insulin lispro (HumaLOG) corrective regimen sliding scale   SubCutaneous at bedtime  insulin lispro Injectable (HumaLOG) 5 Unit(s) SubCutaneous three times a day before meals    MEDICATIONS  (PRN):  acetaminophen   Tablet .. 650 milliGRAM(s) Oral every 4 hours PRN Mild Pain (1 - 3)  calcium carbonate    500 mG (Tums) Chewable 3 Tablet(s) Chew every 6 hours PRN Dyspepsia  dextrose 40% Gel 15 Gram(s) Oral once PRN Blood Glucose LESS THAN 70 milliGRAM(s)/deciliter  glucagon  Injectable 1 milliGRAM(s) IntraMuscular once PRN Glucose LESS THAN 70 milligrams/deciliter      ALLERGIES:  Allergies    No Known Allergies    Intolerances        LABS:                        12.0   6.69  )-----------( 306      ( 17 Sep 2020 05:49 )             33.5         123<L>  |  92<L>  |  16  ----------------------------<  81  4.7   |  24  |  0.85    Ca    8.8      17 Sep 2020 05:49  Mg     1.8         TPro  6.9  /  Alb  4.3  /  TBili  0.3  /  DBili  x   /  AST  31  /  ALT  21  /  AlkPhos  61  09-16    PT/INR - ( 17 Sep 2020 05:49 )   PT: 11.9 sec;   INR: 0.99          PTT - ( 17 Sep 2020 05:49 )  PTT:33.0 sec  Urinalysis Basic - ( 16 Sep 2020 22:28 )    Color: Yellow / Appearance: Clear / S.015 / pH: x  Gluc: x / Ketone: NEGATIVE  / Bili: Negative / Urobili: 0.2 E.U./dL   Blood: x / Protein: NEGATIVE mg/dL / Nitrite: NEGATIVE   Leuk Esterase: NEGATIVE / RBC: x / WBC x   Sq Epi: x / Non Sq Epi: x / Bacteria: x      CAPILLARY BLOOD GLUCOSE      POCT Blood Glucose.: 71 mg/dL (17 Sep 2020 06:49)      RADIOLOGY & ADDITIONAL TESTS: Reviewed.

## 2020-09-17 NOTE — PROGRESS NOTE ADULT - ATTENDING COMMENTS
I have personally seen, examined, and participated in the care of this patient. I have reviewed all pertinent clinical information including history, physical exam, plan, and the resident's note and agree except as noted    Mr. Shultz is a 69 yo M with PMH of T2DM, HTN on losartan who presents with hypertensive urgency, incidentally found to have mild hyperkalemia and hyponatremia. Of note, COVID+  Pt aaox3, NAD, WDWN, NC/AT, PERRL, EOMI, Neck supple no JVD, CVS: Nml S1S2 RRR no murmurs/rubs, Pulm CTAB no wheezing crackles, Abd Soft NTND +BS, Ext normal pulses no LE edema, Neuro no focal deficits moves all four extremities, skin WWP  -hyponatremia: Patient was 125, then received 1L NS, then dropped with normal urine osm, more likely pt with hypotonic hyponatremia and now isotonic after receiving fluid. Still downtrending. Normal tsh, low normal cortisol, Lydia high and Uosm high. Will recheck Na and urine lytes at noon now that patient is on fluid restriction. If patient is uptrending, will cw fluid restriction, if downtrending, will do cosyntropin stim test and consult renal  -HTN urgency: patient takes ARB at home however held in setting of hyperK, given norvasc 2.5 and bp downtrending, will uptitrate as needed  - HyperK: hold arb. now no longer elevated  - HA: likely from HTN and hypoNa, now resolved, will continue to monitor   -COVID+: still with fever, no other signs of infx, has had two weeks of sx now, will obtain antibodies, no resp sx   Otherwise plan as above

## 2020-09-17 NOTE — CONSULT NOTE ADULT - SUBJECTIVE AND OBJECTIVE BOX
Patient is a 70y old  Male who presents with a chief complaint of headache (17 Sep 2020 07:12)    Nephrology consulted for hyponatremia  initially sNa 125, worsening down to 122 s/p bolus of NS  asymptomatic, placed on fluid restriction w sNa back up to 125  sOsm 283, uOsm 469, uNa98  HPI:  69 yo M with PMH of T2DM (insulin, Januvia), hyperkalemia, HTN (on losartan), recent dx of COVID-19 infection without hypoxia (symptom onset 20, positive COVID-19 test dated 20 reviewed on patient's phone), admitted today with a 2-day history of headache. The headache is bilateral and frontal in nature and is not particularly more intense than other headaches the patient has had in the past. He presented because he was concerned that his BP was more elevated than usual (140s systolic at home, usually normotensive), prompting him to present here. He consulted with [facility] and they directed him to the ED on the basis of reported headache. He was in the city for work. His wife is also recently dx as COVID-19 positive, and she is at the bedside. Both are wearing masks. The couple lives in [facility] and the patient commutes to the city for work. There is no personal history of seizure disorder or CVA. Denies any current fever, chills, dizziness, SOB, nausea, vomiting, diarrhea.    ED Course: Vitals 98.5 (oral), 67, 186/94, 18, 98% on RA. Na 125, K 5.8, BUN/Cr 14/0.93, serum osm 283, urine osm 469, urine sodium 98. CTH unremarkable.   (16 Sep 2020 22:01)      PAST MEDICAL & SURGICAL HISTORY:  History of hyperkalemia    HTN (hypertension)    DM (diabetes mellitus), type 2    No significant past surgical history        Allergies    No Known Allergies    Intolerances    FAMILY HISTORY:  No pertinent family history in first degree relatives    SOCIAL HISTORY:  deferred due to covid+ isolation protocol    MEDICATIONS  (STANDING):  amLODIPine   Tablet 2.5 milliGRAM(s) Oral every 24 hours  dextrose 5%. 1000 milliLiter(s) (50 mL/Hr) IV Continuous <Continuous>  dextrose 50% Injectable 12.5 Gram(s) IV Push once  dextrose 50% Injectable 25 Gram(s) IV Push once  dextrose 50% Injectable 25 Gram(s) IV Push once  enoxaparin Injectable 40 milliGRAM(s) SubCutaneous daily  insulin glargine Injectable (LANTUS) 15 Unit(s) SubCutaneous at bedtime  insulin lispro (HumaLOG) corrective regimen sliding scale   SubCutaneous three times a day before meals  insulin lispro (HumaLOG) corrective regimen sliding scale   SubCutaneous at bedtime  insulin lispro Injectable (HumaLOG) 5 Unit(s) SubCutaneous three times a day before meals    MEDICATIONS  (PRN):  acetaminophen   Tablet .. 650 milliGRAM(s) Oral every 4 hours PRN Mild Pain (1 - 3)  calcium carbonate    500 mG (Tums) Chewable 3 Tablet(s) Chew every 6 hours PRN Dyspepsia  dextrose 40% Gel 15 Gram(s) Oral once PRN Blood Glucose LESS THAN 70 milliGRAM(s)/deciliter  glucagon  Injectable 1 milliGRAM(s) IntraMuscular once PRN Glucose LESS THAN 70 milligrams/deciliter      Vital Signs Last 24 Hrs  T(C): 36.5 (17 Sep 2020 12:25), Max: 38.6 (17 Sep 2020 06:51)  T(F): 97.7 (17 Sep 2020 12:25), Max: 101.4 (17 Sep 2020 06:51)  HR: 69 (17 Sep 2020 12:25) (53 - 69)  BP: 171/85 (17 Sep 2020 12:25) (154/86 - 186/94)  BP(mean): --  RR: 18 (17 Sep 2020 12:25) (18 - 18)  SpO2: 100% (17 Sep 2020 12:25) (98% - 100%)    REVIEW OF SYSTEMS:  deferred due to covid+ isolation protocol    PHYSICAL EXAM:  deferred due to covid+ isolation protocol      CAPILLARY BLOOD GLUCOSE      POCT Blood Glucose.: 121 mg/dL (17 Sep 2020 12:11)  POCT Blood Glucose.: 173 mg/dL (17 Sep 2020 08:52)  POCT Blood Glucose.: 71 mg/dL (17 Sep 2020 06:49)  POCT Blood Glucose.: 99 mg/dL (17 Sep 2020 01:06)  POCT Blood Glucose.: 89 mg/dL (16 Sep 2020 23:21)      I&O's Summary    17 Sep 2020 07:01  -  17 Sep 2020 14:03  --------------------------------------------------------  IN: 0 mL / OUT: 1200 mL / NET: -1200 mL          LABS:                            12.0   6.69  )-----------( 306      ( 17 Sep 2020 05:49 )             33.5       PT/INR - ( 17 Sep 2020 05:49 )   PT: 11.9 sec;   INR: 0.99          PTT - ( 17 Sep 2020 05:49 )  PTT:33.0 sec      Urinalysis Basic - ( 16 Sep 2020 22:28 )    Color: Yellow / Appearance: Clear / S.015 / pH: x  Gluc: x / Ketone: NEGATIVE  / Bili: Negative / Urobili: 0.2 E.U./dL   Blood: x / Protein: NEGATIVE mg/dL / Nitrite: NEGATIVE   Leuk Esterase: NEGATIVE / RBC: x / WBC x   Sq Epi: x / Non Sq Epi: x / Bacteria: x        RADIOLOGY & ADDITIONAL TESTS:    reviewed

## 2020-09-17 NOTE — PROGRESS NOTE ADULT - PROBLEM SELECTOR PLAN 2
ADDENDUM: resolved w/ toradol and reglan given by ED # Hypertensive urgency  In setting of hyperkalemia, would favor holding home losartan  - c/w 2.5mg amlodipine q24 hours

## 2020-09-17 NOTE — PROGRESS NOTE ADULT - PROBLEM SELECTOR PLAN 3
ADDENDUM: pt and wife report being dxd w/ COVID on 9/3 after experiencing sxs of HA x 1 week. denies any other sxs, monitor o/n, followup CXR ADDENDUM: resolved w/ toradol and reglan given by ED

## 2020-09-17 NOTE — CONSULT NOTE ADULT - ATTENDING COMMENTS
accelerated HTN-  BP now better controlled  with medications    hyponatremia  with concentrated Uosm and elevated Lydia   c/w vasopressin elaboration  maintain level of Na- 125  if dips again will add 3% saline  needs repeat Lydia tonight and again early AM

## 2020-09-17 NOTE — CONSULT NOTE ADULT - ASSESSMENT
69 yo M with PMH of T2DM, HTN on losartan who presents with hypertensive urgency, incidentally found to have mild hyperkalemia w hyponatremia.      # Hyponatremia   - sNa 125, sOsm 283, uOsm 469, uNa98  - overnight sNa down to 122, asymptomatic - placed on fluid restriction w sNa back up to 125  - would recommend 1.5 L/day fluid restriction for now  - repeat sNa, Yenny, uOsm at 4 pm  - sNa goal 131 by 12 pm 9/18  - cortisol and TSH noted  Continue to follow

## 2020-09-17 NOTE — PROGRESS NOTE ADULT - PROBLEM SELECTOR PLAN 4
ADDENDUM: resolved on own, holding losartan o/n ADDENDUM: pt and wife report being dxd w/ COVID on 9/3 after experiencing sxs of HA x 1 week. denies any other sxs, monitor o/n, followup CXR

## 2020-09-17 NOTE — PROGRESS NOTE ADULT - ASSESSMENT
Mr. Shultz is a 71 yo M with PMH of T2DM, HTN on losartan who presents with hypertensive urgency, incidentally found to have mild hyperkalemia and isotonic vs hypotonic hyponatremia (favor hypotonic). His headache, which is not different in character from prior occasional headaches, and was relieved by NSAIDs/Tylenol/Reglan in the ED as well as by Motrin at home, does not appear to be consistent with true hypertensive emergency; cannot exclude that headache is being caused or exacerbated by hyponatremia.

## 2020-09-17 NOTE — PROGRESS NOTE ADULT - PROBLEM SELECTOR PLAN 1
# Hyponatremia with hyperkalemia  Na 125, K 5.8, BUN/Cr 14/0.93, serum osm 283, urine osm 469, urine sodium 98. If hypotonic, when associated with concentrated urine and high urine sodium, consider very mild relative adrenal insufficiency, either secondary to exogenous corticosteroids or secondary to acute illness. If truly isotonic, which is uncommon, exclude pseudohyponatremia secondary to severe dyslipidemia.  - f/u AM morning cortisol  - f/u repeat BMP in AM  - f/u lipid panel    ADDENDUM: check CXR and legionella, monitor BMP, hold ARB for now # Hyponatremia with hyperkalemia  Na 125, K 5.8, BUN/Cr 14/0.93, serum osm 283, urine osm 469, urine sodium 98. Received 1L NS and continued to drop, therefore more likely hypotonic hyponatremia. Now improving slowly with fluid restriction on repeat BMP (122-->125). TSH normal, lipids wnl, low nml cortisol  -renal consulted, appreciate recs  -will repeat urine lytes, serum osm, BMP at 4 PM  -if Na continues to downtrend, will consider cortisol stimulation test  ADDENDUM: check CXR and legionella, monitor BMP, hold ARB for now

## 2020-09-17 NOTE — PROGRESS NOTE ADULT - PROBLEM SELECTOR PLAN 5
# Hypertensive urgency  In setting of hyperkalemia, would favor holding home losartan  - consider amlodipine 5 mg but will hold off for now given improvement with treatment of headache symptoms, especially given that if there is occult adrenal insufficiency, overt adrenal insufficiency would likely be represented by hypotension. ADDENDUM: resolved on own, holding losartan o/n

## 2020-09-17 NOTE — CHART NOTE - NSCHARTNOTEFT_GEN_A_CORE
Called by nurse that patient repeatedly asking when he is leaving. Went into the room and explained to the patient that given his low sodium he is not being discharged today. Patient states that he needs to leave because his wife has been sleeping in their car outside the hospital for 2 days waiting to take him back to [place]. Patient has been explained multiple times throughout the day why leaving with hyponatremia is dangerous and that his condition if not treated properly could lead to seizures, coma, and/or death. Risks were re-stated in this conversation. Patient stated he understands the risk and that he still wants to leave. He states he will likely go to a hospital near his home. Patient deemed to have capacity, understands risk and benefits of leaving, and is leaving against medical advice.

## 2020-09-18 LAB
CULTURE RESULTS: NO GROWTH — SIGNIFICANT CHANGE UP
SPECIMEN SOURCE: SIGNIFICANT CHANGE UP

## 2020-09-23 DIAGNOSIS — E87.5 HYPERKALEMIA: ICD-10-CM

## 2020-09-23 DIAGNOSIS — E87.1 HYPO-OSMOLALITY AND HYPONATREMIA: ICD-10-CM

## 2020-09-23 DIAGNOSIS — R51 HEADACHE: ICD-10-CM

## 2020-09-23 DIAGNOSIS — U07.1 COVID-19: ICD-10-CM

## 2020-09-23 DIAGNOSIS — I10 ESSENTIAL (PRIMARY) HYPERTENSION: ICD-10-CM

## 2020-09-23 DIAGNOSIS — I16.0 HYPERTENSIVE URGENCY: ICD-10-CM

## 2020-09-23 DIAGNOSIS — E11.9 TYPE 2 DIABETES MELLITUS WITHOUT COMPLICATIONS: ICD-10-CM

## 2020-09-23 DIAGNOSIS — Z79.4 LONG TERM (CURRENT) USE OF INSULIN: ICD-10-CM
